# Patient Record
Sex: FEMALE | Race: WHITE | Employment: OTHER | URBAN - METROPOLITAN AREA
[De-identification: names, ages, dates, MRNs, and addresses within clinical notes are randomized per-mention and may not be internally consistent; named-entity substitution may affect disease eponyms.]

---

## 2017-05-01 ENCOUNTER — HOSPITAL ENCOUNTER (EMERGENCY)
Age: 82
Discharge: HOME | End: 2017-05-02
Attending: EMERGENCY MEDICINE
Payer: COMMERCIAL

## 2017-05-01 ENCOUNTER — APPOINTMENT (OUTPATIENT)
Dept: GENERAL RADIOLOGY | Age: 82
End: 2017-05-01
Attending: EMERGENCY MEDICINE
Payer: COMMERCIAL

## 2017-05-01 DIAGNOSIS — R41.0 DELIRIUM: Primary | ICD-10-CM

## 2017-05-01 LAB
ALBUMIN SERPL BCP-MCNC: 3.7 G/DL (ref 3.2–4.6)
ALBUMIN/GLOB SERPL: 0.9 {RATIO} (ref 1.2–3.5)
ALP SERPL-CCNC: 77 U/L (ref 50–136)
ALT SERPL-CCNC: 21 U/L (ref 12–65)
ANION GAP BLD CALC-SCNC: 7 MMOL/L (ref 7–16)
AST SERPL W P-5'-P-CCNC: 17 U/L (ref 15–37)
BILIRUB SERPL-MCNC: 0.2 MG/DL (ref 0.2–1.1)
BNP SERPL-MCNC: 82 PG/ML
BUN SERPL-MCNC: 40 MG/DL (ref 8–23)
CALCIUM SERPL-MCNC: 10 MG/DL (ref 8.3–10.4)
CHLORIDE SERPL-SCNC: 103 MMOL/L (ref 98–107)
CO2 SERPL-SCNC: 28 MMOL/L (ref 21–32)
CREAT SERPL-MCNC: 1.83 MG/DL (ref 0.6–1)
ERYTHROCYTE [DISTWIDTH] IN BLOOD BY AUTOMATED COUNT: 14.7 % (ref 11.9–14.6)
GLOBULIN SER CALC-MCNC: 4.2 G/DL (ref 2.3–3.5)
GLUCOSE SERPL-MCNC: 124 MG/DL (ref 65–100)
HCT VFR BLD AUTO: 36.8 % (ref 35.8–46.3)
HGB BLD-MCNC: 11.7 G/DL (ref 11.7–15.4)
MCH RBC QN AUTO: 28.2 PG (ref 26.1–32.9)
MCHC RBC AUTO-ENTMCNC: 31.8 G/DL (ref 31.4–35)
MCV RBC AUTO: 88.7 FL (ref 79.6–97.8)
PLATELET # BLD AUTO: 213 K/UL (ref 150–450)
PMV BLD AUTO: 10.8 FL (ref 10.8–14.1)
POTASSIUM SERPL-SCNC: 4.1 MMOL/L (ref 3.5–5.1)
PROT SERPL-MCNC: 7.9 G/DL (ref 6.3–8.2)
RBC # BLD AUTO: 4.15 M/UL (ref 4.05–5.25)
SODIUM SERPL-SCNC: 138 MMOL/L (ref 136–145)
TROPONIN I BLD-MCNC: 0.01 NG/ML (ref 0–0.08)
WBC # BLD AUTO: 6.8 K/UL (ref 4.3–11.1)

## 2017-05-01 PROCEDURE — 96374 THER/PROPH/DIAG INJ IV PUSH: CPT | Performed by: EMERGENCY MEDICINE

## 2017-05-01 PROCEDURE — 83880 ASSAY OF NATRIURETIC PEPTIDE: CPT | Performed by: EMERGENCY MEDICINE

## 2017-05-01 PROCEDURE — 93005 ELECTROCARDIOGRAM TRACING: CPT | Performed by: EMERGENCY MEDICINE

## 2017-05-01 PROCEDURE — 99285 EMERGENCY DEPT VISIT HI MDM: CPT | Performed by: EMERGENCY MEDICINE

## 2017-05-01 PROCEDURE — 80053 COMPREHEN METABOLIC PANEL: CPT | Performed by: EMERGENCY MEDICINE

## 2017-05-01 PROCEDURE — 74011250637 HC RX REV CODE- 250/637: Performed by: EMERGENCY MEDICINE

## 2017-05-01 PROCEDURE — 74011250636 HC RX REV CODE- 250/636: Performed by: EMERGENCY MEDICINE

## 2017-05-01 PROCEDURE — 85027 COMPLETE CBC AUTOMATED: CPT | Performed by: EMERGENCY MEDICINE

## 2017-05-01 PROCEDURE — 96375 TX/PRO/DX INJ NEW DRUG ADDON: CPT | Performed by: EMERGENCY MEDICINE

## 2017-05-01 PROCEDURE — 71010 XR CHEST PORT: CPT

## 2017-05-01 PROCEDURE — 84484 ASSAY OF TROPONIN QUANT: CPT

## 2017-05-01 RX ORDER — LORAZEPAM 1 MG/1
1 TABLET ORAL
Status: COMPLETED | OUTPATIENT
Start: 2017-05-01 | End: 2017-05-01

## 2017-05-01 RX ORDER — LORAZEPAM 2 MG/ML
1 INJECTION INTRAMUSCULAR
Status: COMPLETED | OUTPATIENT
Start: 2017-05-01 | End: 2017-05-01

## 2017-05-01 RX ORDER — HYDRALAZINE HYDROCHLORIDE 20 MG/ML
20 INJECTION INTRAMUSCULAR; INTRAVENOUS
Status: COMPLETED | OUTPATIENT
Start: 2017-05-01 | End: 2017-05-01

## 2017-05-01 RX ADMIN — LORAZEPAM 1 MG: 1 TABLET ORAL at 21:55

## 2017-05-01 RX ADMIN — HYDRALAZINE HYDROCHLORIDE 20 MG: 20 INJECTION INTRAMUSCULAR; INTRAVENOUS at 21:55

## 2017-05-01 RX ADMIN — LORAZEPAM 1 MG: 2 INJECTION, SOLUTION INTRAMUSCULAR; INTRAVENOUS at 22:26

## 2017-05-02 VITALS
OXYGEN SATURATION: 94 % | WEIGHT: 100 LBS | DIASTOLIC BLOOD PRESSURE: 65 MMHG | HEIGHT: 65 IN | TEMPERATURE: 98.1 F | RESPIRATION RATE: 19 BRPM | SYSTOLIC BLOOD PRESSURE: 136 MMHG | BODY MASS INDEX: 16.66 KG/M2 | HEART RATE: 86 BPM

## 2017-05-02 LAB
ATRIAL RATE: 88 BPM
CALCULATED P AXIS, ECG09: 69 DEGREES
CALCULATED R AXIS, ECG10: 59 DEGREES
CALCULATED T AXIS, ECG11: 61 DEGREES
DIAGNOSIS, 93000: NORMAL
P-R INTERVAL, ECG05: 150 MS
Q-T INTERVAL, ECG07: 402 MS
QRS DURATION, ECG06: 114 MS
QTC CALCULATION (BEZET), ECG08: 486 MS
VENTRICULAR RATE, ECG03: 88 BPM

## 2017-05-02 NOTE — ED PROVIDER NOTES
HPI Comments: Patient was referred and return for evaluation for complaints of shortness of breath. The patient is very hard of hearing and also suffers from dementia and anxiety unable to give a clear history or review of systems. Patient reports to me that she is hurting in the suprapubic area with the back area or right arm and when she arrived she was kept repeating that she couldn't breathe. Now she states repeatedly that she has to [place]. She is reportedly noncompliant with medication and is also hypertensive upon arrival with a blood pressure of 211/93    The history is provided by the patient and the EMS personnel. The history is limited by the condition of the patient. Past Medical History:   Diagnosis Date    Cancer ([facility] [address]     kidney    Dementia     Hypertension     Intestinal cancer ([facility] [address]        Past Surgical History:   Procedure Laterality Date    HX BREAST LUMPECTOMY      HX OTHER SURGICAL      kindey removal - one left     HX OTHER SURGICAL  04/2016    broken wrist          Family History:   Problem Relation Age of Onset    Cancer Mother      lung    Hypertension Father        Social History     Social History    Marital status: SINGLE     Spouse name: N/A    Number of children: N/A    Years of education: N/A     Occupational History    Not on file. Social History Main Topics    Smoking status: Never Smoker    Smokeless tobacco: Never Used    Alcohol use No    Drug use: Not on file    Sexual activity: Not on file     Other Topics Concern    Not on file     Social History Narrative         ALLERGIES: Coconut and Cortisone    Review of Systems   Unable to perform ROS: Dementia   Constitutional: Negative for chills and fever. Cardiovascular: Positive for chest pain. Genitourinary: Negative for dysuria. All other systems reviewed and are negative.       Vitals:    05/01/17 2119   BP: (!) 211/93   Pulse: 94   Resp: 16   Temp: 98.1 °F (36.7 °C)   SpO2: 96%   Weight: 45.4 kg (100 lb)   Height: 5' 5\" (1.651 m)            Physical Exam   Constitutional: She is oriented to person, place, and time. She appears well-developed and well-nourished. No distress. HENT:   Head: Normocephalic and atraumatic. Mouth/Throat: No oropharyngeal exudate. Eyes: Conjunctivae and EOM are normal. Pupils are equal, round, and reactive to light. Neck: Normal range of motion. Neck supple. Cardiovascular: Normal rate, regular rhythm, normal heart sounds and intact distal pulses. Pulmonary/Chest: Effort normal and breath sounds normal.   Abdominal: Soft. Bowel sounds are normal.   Musculoskeletal: Normal range of motion. She exhibits no edema, tenderness or deformity. Lymphadenopathy:     She has no cervical adenopathy. Neurological: She is alert and oriented to person, place, and time. Skin: Skin is warm and dry. Psychiatric: She has a normal mood and affect. Her behavior is normal.   Nursing note and vitals reviewed.        MDM  Number of Diagnoses or Management Options     Amount and/or Complexity of Data Reviewed  Clinical lab tests: ordered and reviewed  Tests in the radiology section of CPT®: ordered and reviewed  Independent visualization of images, tracings, or specimens: yes    Risk of Complications, Morbidity, and/or Mortality  Presenting problems: moderate  Diagnostic procedures: moderate  Management options: moderate    Patient Progress  Patient progress: stable    ED Course       Procedures

## 2017-05-02 NOTE — ED TRIAGE NOTES
Pt arrived to ED via EMS c/o difficulty breathing. Per assisted living staff PT has had multiple anxiety attacks. PT has clear and equal lung sounds. Per EMS PT is not compliant with her medication.

## 2017-05-02 NOTE — DISCHARGE INSTRUCTIONS
Learning About Delirium  What is delirium? Delirium is a sudden change in mental condition. It leads to confusion and unusual behavior. Delirium is also called acute confusional state. Delirium affects all age groups. It can result from problems that affect the brain, such as stroke. It can also happen after an infection or when using certain medicines. Pain may also cause the problem. Seeing delirium in a loved one can be scary and sad. But it will go away most of the time. It usually lasts hours to days. The doctor will look for a cause and take steps to treat it and keep your loved one comfortable. What are the symptoms? Symptoms of delirium usually develop over several hours to a few days. Symptoms may change and be more or less severe. Symptoms include:  · A short attention span. · Confusion. This is not knowing where you are, what time it is, or who others are. · Hallucinations. This usually is seeing or hearing things that are not really there. · Delusions. This is believing things that aren't true. · Illusions. This is making a mistake in what you think is real. For example, you think a child is crying, but it's a pillow. · Disorganized thinking. How is delirium treated? The doctor may:  · Find and treat the cause. This could be:  ¨ Not getting enough fluids. ¨ An infection. ¨ A medicine or combination of medicines. ¨ Another medical problem. · Prescribe a medicine. · Make the hospital room as quiet as possible. You may be able to help your loved one by being present and talking to and touching him or her. Follow-up care is a key part of your treatment and safety. Be sure to make and go to all appointments, and call your doctor if you are having problems. It's also a good idea to know your test results and keep a list of the medicines you take. Where can you learn more? Go to http://blake-hussein.info/.   Enter B866 in the search box to learn more about \"Learning About Delirium. \"  Current as of: July 26, 2016  Content Version: 11.2  © 1154-1519 [employer], [facility]. Care instructions adapted under license by [employer] (which disclaims liability or warranty for this information). If you have questions about a medical condition or this instruction, always ask your healthcare professional. [place] [address] any warranty or liability for your use of this information.

## 2018-01-23 ENCOUNTER — APPOINTMENT (OUTPATIENT)
Dept: GENERAL RADIOLOGY | Age: 83
End: 2018-01-23
Attending: EMERGENCY MEDICINE
Payer: MEDICARE

## 2018-01-23 ENCOUNTER — HOSPITAL ENCOUNTER (EMERGENCY)
Age: 83
Discharge: HOME | End: 2018-01-24
Attending: EMERGENCY MEDICINE
Payer: MEDICARE

## 2018-01-23 DIAGNOSIS — F41.9 ACUTE ANXIETY: ICD-10-CM

## 2018-01-23 DIAGNOSIS — R06.02 SOB (SHORTNESS OF BREATH): Primary | ICD-10-CM

## 2018-01-23 DIAGNOSIS — J18.9 COMMUNITY ACQUIRED PNEUMONIA OF RIGHT LUNG, UNSPECIFIED PART OF LUNG: ICD-10-CM

## 2018-01-23 LAB
ALBUMIN SERPL-MCNC: 3.5 G/DL (ref 3.2–4.6)
ALBUMIN/GLOB SERPL: 0.9 {RATIO} (ref 1.2–3.5)
ALP SERPL-CCNC: 79 U/L (ref 50–136)
ALT SERPL-CCNC: 18 U/L (ref 12–65)
ANION GAP SERPL CALC-SCNC: 9 MMOL/L (ref 7–16)
AST SERPL-CCNC: 20 U/L (ref 15–37)
BASOPHILS # BLD: 0 K/UL (ref 0–0.2)
BASOPHILS NFR BLD: 0 % (ref 0–2)
BILIRUB SERPL-MCNC: 0.2 MG/DL (ref 0.2–1.1)
BUN SERPL-MCNC: 35 MG/DL (ref 8–23)
CALCIUM SERPL-MCNC: 9.8 MG/DL (ref 8.3–10.4)
CHLORIDE SERPL-SCNC: 104 MMOL/L (ref 98–107)
CO2 SERPL-SCNC: 29 MMOL/L (ref 21–32)
CREAT SERPL-MCNC: 1.45 MG/DL (ref 0.6–1)
DIFFERENTIAL METHOD BLD: ABNORMAL
EOSINOPHIL # BLD: 0.4 K/UL (ref 0–0.8)
EOSINOPHIL NFR BLD: 5 % (ref 0.5–7.8)
ERYTHROCYTE [DISTWIDTH] IN BLOOD BY AUTOMATED COUNT: 14.4 % (ref 11.9–14.6)
GLOBULIN SER CALC-MCNC: 3.7 G/DL (ref 2.3–3.5)
GLUCOSE SERPL-MCNC: 123 MG/DL (ref 65–100)
HCT VFR BLD AUTO: 38.2 % (ref 35.8–46.3)
HGB BLD-MCNC: 12 G/DL (ref 11.7–15.4)
IMM GRANULOCYTES # BLD: 0 K/UL (ref 0–0.5)
IMM GRANULOCYTES NFR BLD AUTO: 1 % (ref 0–5)
LYMPHOCYTES # BLD: 1.5 K/UL (ref 0.5–4.6)
LYMPHOCYTES NFR BLD: 22 % (ref 13–44)
MCH RBC QN AUTO: 28.6 PG (ref 26.1–32.9)
MCHC RBC AUTO-ENTMCNC: 31.4 G/DL (ref 31.4–35)
MCV RBC AUTO: 91 FL (ref 79.6–97.8)
MONOCYTES # BLD: 1 K/UL (ref 0.1–1.3)
MONOCYTES NFR BLD: 14 % (ref 4–12)
NEUTS SEG # BLD: 4 K/UL (ref 1.7–8.2)
NEUTS SEG NFR BLD: 58 % (ref 43–78)
PLATELET # BLD AUTO: 267 K/UL (ref 150–450)
PMV BLD AUTO: 10 FL (ref 10.8–14.1)
POTASSIUM SERPL-SCNC: 4.4 MMOL/L (ref 3.5–5.1)
PROT SERPL-MCNC: 7.2 G/DL (ref 6.3–8.2)
RBC # BLD AUTO: 4.2 M/UL (ref 4.05–5.25)
SODIUM SERPL-SCNC: 142 MMOL/L (ref 136–145)
TROPONIN I BLD-MCNC: 0.01 NG/ML (ref 0.02–0.05)
WBC # BLD AUTO: 6.9 K/UL (ref 4.3–11.1)

## 2018-01-23 PROCEDURE — 80053 COMPREHEN METABOLIC PANEL: CPT | Performed by: EMERGENCY MEDICINE

## 2018-01-23 PROCEDURE — 99284 EMERGENCY DEPT VISIT MOD MDM: CPT | Performed by: EMERGENCY MEDICINE

## 2018-01-23 PROCEDURE — 84484 ASSAY OF TROPONIN QUANT: CPT

## 2018-01-23 PROCEDURE — 93005 ELECTROCARDIOGRAM TRACING: CPT | Performed by: EMERGENCY MEDICINE

## 2018-01-23 PROCEDURE — 85025 COMPLETE CBC W/AUTO DIFF WBC: CPT | Performed by: EMERGENCY MEDICINE

## 2018-01-23 PROCEDURE — 83690 ASSAY OF LIPASE: CPT | Performed by: EMERGENCY MEDICINE

## 2018-01-23 PROCEDURE — 71045 X-RAY EXAM CHEST 1 VIEW: CPT

## 2018-01-24 VITALS
TEMPERATURE: 97.6 F | HEIGHT: 65 IN | OXYGEN SATURATION: 95 % | DIASTOLIC BLOOD PRESSURE: 78 MMHG | RESPIRATION RATE: 16 BRPM | BODY MASS INDEX: 16.66 KG/M2 | HEART RATE: 94 BPM | SYSTOLIC BLOOD PRESSURE: 184 MMHG | WEIGHT: 100 LBS

## 2018-01-24 LAB
ATRIAL RATE: 96 BPM
CALCULATED P AXIS, ECG09: 63 DEGREES
CALCULATED R AXIS, ECG10: 5 DEGREES
CALCULATED T AXIS, ECG11: 49 DEGREES
DIAGNOSIS, 93000: NORMAL
LIPASE SERPL-CCNC: 97 U/L (ref 73–393)
P-R INTERVAL, ECG05: 154 MS
Q-T INTERVAL, ECG07: 390 MS
QRS DURATION, ECG06: 102 MS
QTC CALCULATION (BEZET), ECG08: 492 MS
VENTRICULAR RATE, ECG03: 96 BPM

## 2018-01-24 PROCEDURE — 74011250637 HC RX REV CODE- 250/637: Performed by: EMERGENCY MEDICINE

## 2018-01-24 RX ORDER — CEPHALEXIN 500 MG/1
500 CAPSULE ORAL
Status: COMPLETED | OUTPATIENT
Start: 2018-01-24 | End: 2018-01-24

## 2018-01-24 RX ORDER — LEVOFLOXACIN 250 MG/1
250 TABLET ORAL DAILY
Qty: 6 TAB | Refills: 0 | Status: SHIPPED | OUTPATIENT
Start: 2018-01-24 | End: 2018-01-24 | Stop reason: ALTCHOICE

## 2018-01-24 RX ORDER — CEPHALEXIN 500 MG/1
500 CAPSULE ORAL 3 TIMES DAILY
Qty: 21 CAP | Refills: 0 | Status: SHIPPED | OUTPATIENT
Start: 2018-01-24 | End: 2018-01-31

## 2018-01-24 RX ORDER — LEVOFLOXACIN 250 MG/1
250 TABLET ORAL
Status: DISCONTINUED | OUTPATIENT
Start: 2018-01-24 | End: 2018-01-24

## 2018-01-24 RX ADMIN — CEPHALEXIN 500 MG: 500 CAPSULE ORAL at 02:12

## 2018-01-24 NOTE — ED NOTES
Pt reports sob for a few days, congestion, LLQ, LUQ abdominal pain. On call at home nurse states pt told her she was having sinus drainage down the back of her throat and was complaining of sore throat. Tenderness noted to LUQ, LLQ, epigastric area. Pt believes this pain might be her pancreas because she has had problems with it before. Last BM per caregiver was Sunday night. No fever as reported by caregiver. Pt denies chest pain, dizziness, syncope, headache, n/v/d, constipation, cough. +bowel sounds. Breath sounds clear and equal bilaterally.

## 2018-01-24 NOTE — ED TRIAGE NOTES
Pt came via ems for c/o sob, pt states she feels dizzy and doesn't want to be alone. Pt is [place] and difficult to communicate with.  Pt did not bring med list

## 2018-01-24 NOTE — DISCHARGE INSTRUCTIONS
Pneumonia: Care Instructions  Your Care Instructions    Pneumonia is an infection of the lungs. Most cases are caused by infections from bacteria or viruses. Pneumonia may be mild or very severe. If it is caused by bacteria, you will be treated with antibiotics. It may take a few weeks to a few months to recover fully from pneumonia, depending on how sick you were and whether your overall health is good. Follow-up care is a key part of your treatment and safety. Be sure to make and go to all appointments, and call your doctor if you are having problems. It's also a good idea to know your test results and keep a list of the medicines you take. How can you care for yourself at home? · Take your antibiotics exactly as directed. Do not stop taking the medicine just because you are feeling better. You need to take the full course of antibiotics. · Take your medicines exactly as prescribed. Call your doctor if you think you are having a problem with your medicine. · Get plenty of rest and sleep. You may feel weak and tired for a while, but your energy level will improve with time. · To prevent dehydration, drink plenty of fluids, enough so that your urine is light yellow or clear like water. Choose water and other caffeine-free clear liquids until you feel better. If you have kidney, heart, or liver disease and have to limit fluids, talk with your doctor before you increase the amount of fluids you drink. · Take care of your cough so you can rest. A cough that brings up mucus from your lungs is common with pneumonia. It is one way your body gets rid of the infection. But if coughing keeps you from resting or causes severe fatigue and chest-wall pain, talk to your doctor. He or she may suggest that you take a medicine to reduce the cough. · Use a vaporizer or humidifier to add moisture to your bedroom. Follow the directions for cleaning the machine. · Do not smoke or allow others to smoke around you.  Smoke will make your cough last longer. If you need help quitting, talk to your doctor about stop-smoking programs and medicines. These can increase your chances of quitting for good. · Take an over-the-counter pain medicine, such as acetaminophen (Tylenol), ibuprofen (Advil, Motrin), or naproxen (Aleve). Read and follow all instructions on the label. · Do not take two or more pain medicines at the same time unless the doctor told you to. Many pain medicines have acetaminophen, which is Tylenol. Too much acetaminophen (Tylenol) can be harmful. · If you were given a spirometer to measure how well your lungs are working, use it as instructed. This can help your doctor tell how your recovery is going. · To prevent pneumonia in the future, talk to your doctor about getting a flu vaccine (once a year) and a pneumococcal vaccine (one time only for most people). When should you call for help? Call 911 anytime you think you may need emergency care. For example, call if:  ? · You have severe trouble breathing. ?Call your doctor now or seek immediate medical care if:  ? · You cough up dark brown or bloody mucus (sputum). ? · You have new or worse trouble breathing. ? · You are dizzy or lightheaded, or you feel like you may faint. ? Watch closely for changes in your health, and be sure to contact your doctor if:  ? · You have a new or higher fever. ? · You are coughing more deeply or more often. ? · You are not getting better after 2 days (48 hours). ? · You do not get better as expected. Where can you learn more? Go to http://blake-hussein.info/. Enter [id number] in the search box to learn more about \"Pneumonia: Care Instructions. \"  Current as of: May 12, 2017  Content Version: 11.4  © 5149-0020 [employer]. Care instructions adapted under license by [employer] (which disclaims liability or warranty for this information).  If you have questions about a medical condition or this instruction, always ask your healthcare professional. [place] [address] any warranty or liability for your use of this information. Shortness of Breath: Care Instructions  Your Care Instructions  Shortness of breath has many causes. Sometimes conditions such as anxiety can lead to shortness of breath. Some people get mild shortness of breath when they exercise. Trouble breathing also can be a symptom of a serious problem, such as asthma, lung disease, emphysema, heart problems, and pneumonia. If your shortness of breath continues, you may need tests and treatment. Watch for any changes in your breathing and other symptoms. Follow-up care is a key part of your treatment and safety. Be sure to make and go to all appointments, and call your doctor if you are having problems. It's also a good idea to know your test results and keep a list of the medicines you take. How can you care for yourself at home? · Do not smoke or allow others to smoke around you. If you need help quitting, talk to your doctor about stop-smoking programs and medicines. These can increase your chances of quitting for good. · Get plenty of rest and sleep. · Take your medicines exactly as prescribed. Call your doctor if you think you are having a problem with your medicine. · Find healthy ways to deal with stress. ¨ Exercise daily. ¨ Get plenty of sleep. ¨ Eat regularly and well. When should you call for help? Call 911 anytime you think you may need emergency care. For example, call if:  ? · You have severe shortness of breath. ? · You have symptoms of a heart attack. These may include:  ¨ Chest pain or pressure, or a strange feeling in the chest.  ¨ Sweating. ¨ Shortness of breath. ¨ Nausea or vomiting. ¨ Pain, pressure, or a strange feeling in the back, neck, jaw, or upper belly or in one or both shoulders or arms. ¨ Lightheadedness or sudden weakness. ¨ A fast or irregular heartbeat.   After you call 911, the  may tell you to chew 1 adult-strength or 2 to 4 low-dose aspirin. Wait for an ambulance. Do not try to drive yourself. ?Call your doctor now or seek immediate medical care if:  ? · Your shortness of breath gets worse or you start to wheeze. Wheezing is a high-pitched sound when you breathe. ? · You wake up at night out of breath or have to prop your head up on several pillows to breathe. ? · You are short of breath after only light activity or while at rest.   ? Watch closely for changes in your health, and be sure to contact your doctor if:  ? · You do not get better over the next 1 to 2 days. Where can you learn more? Go to http://blake-hussein.info/. Enter S780 in the search box to learn more about \"Shortness of Breath: Care Instructions. \"  Current as of: May 12, 2017  Content Version: 11.4  © 6972-4824 [employer]. Care instructions adapted under license by [employer] (which disclaims liability or warranty for this information). If you have questions about a medical condition or this instruction, always ask your healthcare professional. [place] [address] any warranty or liability for your use of this information. Shortness of Breath: Care Instructions  Your Care Instructions  Shortness of breath has many causes. Sometimes conditions such as anxiety can lead to shortness of breath. Some people get mild shortness of breath when they exercise. Trouble breathing also can be a symptom of a serious problem, such as asthma, lung disease, emphysema, heart problems, and pneumonia. If your shortness of breath continues, you may need tests and treatment. Watch for any changes in your breathing and other symptoms. Follow-up care is a key part of your treatment and safety. Be sure to make and go to all appointments, and call your doctor if you are having problems.  It's also a good idea to know your test results and keep a list of the medicines you take. How can you care for yourself at home? · Do not smoke or allow others to smoke around you. If you need help quitting, talk to your doctor about stop-smoking programs and medicines. These can increase your chances of quitting for good. · Get plenty of rest and sleep. · Take your medicines exactly as prescribed. Call your doctor if you think you are having a problem with your medicine. · Find healthy ways to deal with stress. ¨ Exercise daily. ¨ Get plenty of sleep. ¨ Eat regularly and well. When should you call for help? Call 911 anytime you think you may need emergency care. For example, call if:  ? · You have severe shortness of breath. ? · You have symptoms of a heart attack. These may include:  ¨ Chest pain or pressure, or a strange feeling in the chest.  ¨ Sweating. ¨ Shortness of breath. ¨ Nausea or vomiting. ¨ Pain, pressure, or a strange feeling in the back, neck, jaw, or upper belly or in one or both shoulders or arms. ¨ Lightheadedness or sudden weakness. ¨ A fast or irregular heartbeat. After you call 911, the  may tell you to chew 1 adult-strength or 2 to 4 low-dose aspirin. Wait for an ambulance. Do not try to drive yourself. ?Call your doctor now or seek immediate medical care if:  ? · Your shortness of breath gets worse or you start to wheeze. Wheezing is a high-pitched sound when you breathe. ? · You wake up at night out of breath or have to prop your head up on several pillows to breathe. ? · You are short of breath after only light activity or while at rest.   ? Watch closely for changes in your health, and be sure to contact your doctor if:  ? · You do not get better over the next 1 to 2 days. Where can you learn more? Go to http://blake-hussein.info/. Enter S780 in the search box to learn more about \"Shortness of Breath: Care Instructions. \"  Current as of:  May 12, 2017  Content Version: 11.4  © 9919-3913 [employer]. Care instructions adapted under license by [employer] (which disclaims liability or warranty for this information). If you have questions about a medical condition or this instruction, always ask your healthcare professional. [address] any warranty or liability for your use of this information.

## 2018-01-24 NOTE — ED PROVIDER NOTES
HPI Comments: Patient is on hospice with [facility]. Apparently, the patient has no one at the home from 8 PM to 11 PM each evening but otherwise has full care. Tonight the patient was complaining of some shortness of breath as well as some lower abdominal discomfort, her neighbor came over 15 minutes before the patient was to be seen by her care provider and called EMS. According to the patient's caretaker, this frequently happens to the patient and just the addition of some oxygen as well as some reassuring application of some lotion usually calms the patient quite quickly. Patient is a 80 y.o. female presenting with shortness of breath. The history is provided by the patient. Shortness of Breath   This is a recurrent problem. The average episode lasts 10 minutes. The problem occurs intermittently. The current episode started less than 1 hour ago. The problem has been resolved. Associated symptoms include abdominal pain (chronic left lower quadrant). Pertinent negatives include no fever, no headaches, no coryza, no rhinorrhea, no sore throat, no swollen glands, no ear pain, no neck pain, no cough, no sputum production, no hemoptysis, no wheezing, no PND, no orthopnea, no chest pain, no syncope, no vomiting, no rash, no leg pain, no leg swelling and no claudication. Precipitated by: as this patient appears to this frequently when left alone, caretakers seem to feel it was secondary to her time alone at the home tonight. Treatments tried: patient was given nasal oxygen tubing without hooking up to oxygen. The treatment provided significant relief. She has had no prior hospitalizations. She has had no prior ED visits. She has had no prior ICU admissions. Associated medical issues do not include asthma, COPD, pneumonia, chronic lung disease, PE, CAD or heart failure.         Past Medical History:   Diagnosis Date    Cancer ([facility] [address]     kidney    Dementia     Hypertension     Intestinal cancer ([facility] [address] Past Surgical History:   Procedure Laterality Date    HX BREAST LUMPECTOMY      HX OTHER SURGICAL      kindey removal - one left     HX OTHER SURGICAL  04/2016    broken wrist          Family History:   Problem Relation Age of Onset    Cancer Mother      lung    Hypertension Father        Social History     Social History    Marital status: SINGLE     Spouse name: N/A    Number of children: N/A    Years of education: N/A     Occupational History    Not on file. Social History Main Topics    Smoking status: Never Smoker    Smokeless tobacco: Never Used    Alcohol use No    Drug use: Not on file    Sexual activity: Not on file     Other Topics Concern    Not on file     Social History Narrative         ALLERGIES: Coconut and Cortisone    Review of Systems   Unable to perform ROS: Dementia   Constitutional: Negative for fever. HENT: Negative for ear pain, rhinorrhea and sore throat. Respiratory: Positive for shortness of breath. Negative for cough, hemoptysis, sputum production and wheezing. Cardiovascular: Negative for chest pain, orthopnea, claudication, leg swelling, syncope and PND. Gastrointestinal: Positive for abdominal pain (chronic left lower quadrant). Negative for vomiting. Musculoskeletal: Negative for neck pain. Skin: Negative for rash. Neurological: Negative for headaches. Vitals:    01/23/18 2222   BP: 114/64   Pulse: 92   Resp: 18   Temp: 98.9 °F (37.2 °C)   SpO2: 97%   Weight: 45.4 kg (100 lb)   Height: 5' 5\" (1.651 m)            Physical Exam   Constitutional: She appears well-developed and well-nourished. No distress. Patient is extremely hard of hearing, making communication extremely difficult. HENT:   Head: Normocephalic and atraumatic.    Right Ear: Tympanic membrane and external ear normal.   Left Ear: Tympanic membrane and external ear normal.   Mouth/Throat: Oropharynx is clear and moist.   Eyes: Conjunctivae and EOM are normal. Pupils are equal, round, and reactive to light. Neck: Normal range of motion. Neck supple. No tracheal deviation present. Cardiovascular: Normal rate, regular rhythm, normal heart sounds and intact distal pulses. Exam reveals no gallop and no friction rub. No murmur heard. Pulmonary/Chest: Effort normal and breath sounds normal. No respiratory distress. She has no wheezes. Abdominal: Soft. Bowel sounds are normal. She exhibits no shifting dullness, no distension, no pulsatile liver, no fluid wave, no abdominal bruit, no pulsatile midline mass and no mass. There is no hepatosplenomegaly. There is tenderness (minimal suprapubic, left lower quadrant.) in the suprapubic area and left lower quadrant. There is no rigidity, no rebound, no guarding, no CVA tenderness, no tenderness at McBurney's point and negative Padgett's sign. No hernia. Musculoskeletal: Normal range of motion. She exhibits no edema. Lymphadenopathy:     She has no cervical adenopathy. Neurological: She is alert. She has normal strength. She is disoriented (to time). She displays normal reflexes. No cranial nerve deficit or sensory deficit. Skin: Skin is warm and dry. No rash noted. She is not diaphoretic. No erythema. Psychiatric: She has a normal mood and affect. Her speech is normal. She is slowed. Cognition and memory are impaired. Nursing note and vitals reviewed.        MDM  Number of Diagnoses or Management Options  Acute anxiety: established and worsening  Community acquired pneumonia of right lung, unspecified part of lung: new and requires workup  SOB (shortness of breath): new and requires workup     Amount and/or Complexity of Data Reviewed  Clinical lab tests: ordered and reviewed  Tests in the radiology section of CPT®: ordered and reviewed  Decide to obtain previous medical records or to obtain history from someone other than the patient: yes  Obtain history from someone other than the patient: yes  Review and summarize past medical records: yes  Independent visualization of images, tracings, or specimens: yes    Risk of Complications, Morbidity, and/or Mortality  Presenting problems: high  Diagnostic procedures: moderate  Management options: moderate    Patient Progress  Patient progress: improved    ED Course       Procedures

## 2018-09-01 ENCOUNTER — HOSPITAL ENCOUNTER (EMERGENCY)
Age: 83
Discharge: HOME | End: 2018-09-01
Attending: EMERGENCY MEDICINE
Payer: MEDICARE

## 2018-09-01 ENCOUNTER — APPOINTMENT (OUTPATIENT)
Dept: CT IMAGING | Age: 83
End: 2018-09-01
Attending: EMERGENCY MEDICINE
Payer: MEDICARE

## 2018-09-01 VITALS
WEIGHT: 125 LBS | BODY MASS INDEX: 20.83 KG/M2 | DIASTOLIC BLOOD PRESSURE: 79 MMHG | HEART RATE: 83 BPM | HEIGHT: 65 IN | SYSTOLIC BLOOD PRESSURE: 163 MMHG | RESPIRATION RATE: 20 BRPM | TEMPERATURE: 97.8 F | OXYGEN SATURATION: 92 %
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
## 2018-09-01 PROCEDURE — 70450 CT HEAD/BRAIN W/O DYE: CPT

## 2018-09-01 PROCEDURE — 99284 EMERGENCY DEPT VISIT MOD MDM: CPT | Performed by: EMERGENCY MEDICINE

## 2018-09-01 NOTE — DISCHARGE INSTRUCTIONS
Return with any fevers, vomiting, worsening symptoms, or distal concerns. Follow-up with your primary care doctor for reevaluation.

## 2018-09-01 NOTE — ED TRIAGE NOTES
Patient found Jerl Newness in floor by staff this morning with hematoma present to back of head with abrasion to right knee, unknown if new band-aid was placed prior to EMS arrival and given poor report. Patient was in wheelchair on EMS arrival. Patient with some right ankle pain with EMS.

## 2018-09-01 NOTE — ED PROVIDER NOTES
HPI Comments: 77-year-old lady presents from a nursing home after being found on the floor. She was noted to have a hematoma on the back of her scalp. Patient has some fairly advanced dementia and is not very interactive in the history. EMS noted that she was able to stand and ambulate while transferring in the nursing home. Elements of this note were created using speech recognition software. As such, errors of speech recognition may be present. Patient is a 80 y.o. female presenting with fall. The history is provided by the patient. Fall Past Medical History:  
Diagnosis Date  Cancer ([facility] [address]   
 kidney  Dementia  Hypertension  Intestinal cancer ([facility] [address]  Stroke ([facility] [address] Past Surgical History:  
Procedure Laterality Date  HX BREAST LUMPECTOMY  HX OTHER SURGICAL    
 kindey removal - one left  HX OTHER SURGICAL  04/2016  
 broken wrist   
 
   
Family History:  
Problem Relation Age of Onset  Cancer Mother   
  lung  Hypertension Father Social History Social History  Marital status: SINGLE Spouse name: N/A  
 Number of children: N/A  
 Years of education: N/A Occupational History  Not on file. Social History Main Topics  Smoking status: Never Smoker  Smokeless tobacco: Never Used  Alcohol use No  
 Drug use: Not on file  Sexual activity: Not on file Other Topics Concern  Not on file Social History Narrative ALLERGIES: Ciprofloxacin; Coconut; Cortisone; Levaquin [levofloxacin]; Seroquel [quetiapine]; and Sulfa (sulfonamide antibiotics) Review of Systems Unable to perform ROS: Dementia There were no vitals filed for this visit. Physical Exam  
Constitutional: She appears well-developed and well-nourished. HENT:  
Head: Normocephalic. Posterior scalp hematoma Eyes: Right eye exhibits no discharge. Left eye exhibits no discharge. Pulmonary/Chest: Effort normal and breath sounds normal.  
Neurological: She is alert. Skin: Skin is warm and dry. Nursing note and vitals reviewed. MDM Number of Diagnoses or Management Options Diagnosis management comments: I will give CT scan of her head. ED Course Procedures

## 2018-09-02 NOTE — ED NOTES
[facility] ambulance here to transport patient back to [facility]. Report given to paramedic.